# Patient Record
Sex: FEMALE | Race: OTHER | HISPANIC OR LATINO | URBAN - METROPOLITAN AREA
[De-identification: names, ages, dates, MRNs, and addresses within clinical notes are randomized per-mention and may not be internally consistent; named-entity substitution may affect disease eponyms.]

---

## 2021-05-17 ENCOUNTER — APPOINTMENT (RX ONLY)
Dept: URBAN - METROPOLITAN AREA CLINIC 96 | Facility: CLINIC | Age: 31
Setting detail: DERMATOLOGY
End: 2021-05-17

## 2021-05-17 DIAGNOSIS — L91.8 OTHER HYPERTROPHIC DISORDERS OF THE SKIN: ICD-10-CM

## 2021-05-17 DIAGNOSIS — L21.8 OTHER SEBORRHEIC DERMATITIS: ICD-10-CM

## 2021-05-17 DIAGNOSIS — L85.3 XEROSIS CUTIS: ICD-10-CM

## 2021-05-17 DIAGNOSIS — H01.13 ECZEMATOUS DERMATITIS OF EYELID: ICD-10-CM

## 2021-05-17 DIAGNOSIS — D18.0 HEMANGIOMA: ICD-10-CM

## 2021-05-17 PROBLEM — D23.61 OTHER BENIGN NEOPLASM OF SKIN OF RIGHT UPPER LIMB, INCLUDING SHOULDER: Status: ACTIVE | Noted: 2021-05-17

## 2021-05-17 PROBLEM — D18.01 HEMANGIOMA OF SKIN AND SUBCUTANEOUS TISSUE: Status: ACTIVE | Noted: 2021-05-17

## 2021-05-17 PROBLEM — H01.134 ECZEMATOUS DERMATITIS OF LEFT UPPER EYELID: Status: ACTIVE | Noted: 2021-05-17

## 2021-05-17 PROBLEM — D23.71 OTHER BENIGN NEOPLASM OF SKIN OF RIGHT LOWER LIMB, INCLUDING HIP: Status: ACTIVE | Noted: 2021-05-17

## 2021-05-17 PROBLEM — H01.131 ECZEMATOUS DERMATITIS OF RIGHT UPPER EYELID: Status: ACTIVE | Noted: 2021-05-17

## 2021-05-17 PROCEDURE — 99204 OFFICE O/P NEW MOD 45 MIN: CPT

## 2021-05-17 PROCEDURE — ? PRESCRIPTION

## 2021-05-17 PROCEDURE — ? COUNSELING

## 2021-05-17 PROCEDURE — ? FULL BODY SKIN EXAM - DECLINED

## 2021-05-17 PROCEDURE — ? ADDITIONAL NOTES

## 2021-05-17 RX ORDER — KETOCONAZOLE 20 MG/G
CREAM TOPICAL BID
Qty: 1 | Refills: 2 | Status: ERX | COMMUNITY
Start: 2021-05-17

## 2021-05-17 RX ORDER — HYDROCORTISONE 25 MG/G
OINTMENT TOPICAL BID
Qty: 1 | Refills: 1 | Status: ERX | COMMUNITY
Start: 2021-05-17

## 2021-05-17 RX ORDER — AMMONIUM LACTATE 12 G/100G
LOTION TOPICAL BID
Qty: 1 | Refills: 5 | Status: ERX | COMMUNITY
Start: 2021-05-17

## 2021-05-17 RX ORDER — HYDROCORTISONE 25 MG/G
CREAM TOPICAL BID
Qty: 1 | Refills: 1 | Status: ERX | COMMUNITY
Start: 2021-05-17

## 2021-05-17 RX ADMIN — KETOCONAZOLE: 20 CREAM TOPICAL at 00:00

## 2021-05-17 RX ADMIN — HYDROCORTISONE: 25 CREAM TOPICAL at 00:00

## 2021-05-17 RX ADMIN — HYDROCORTISONE: 25 OINTMENT TOPICAL at 00:00

## 2021-05-17 RX ADMIN — AMMONIUM LACTATE: 12 LOTION TOPICAL at 00:00

## 2021-05-17 ASSESSMENT — LOCATION DETAILED DESCRIPTION DERM
LOCATION DETAILED: LEFT CENTRAL POSTAURICULAR SKIN
LOCATION DETAILED: LEFT CYMBA CONCHA
LOCATION DETAILED: RIGHT INFERIOR POSTAURICULAR SKIN
LOCATION DETAILED: LEFT INFERIOR ANTERIOR NECK
LOCATION DETAILED: RIGHT POSTERIOR SHOULDER
LOCATION DETAILED: LEFT SUPERIOR MEDIAL FOREHEAD
LOCATION DETAILED: RIGHT CYMBA CONCHA
LOCATION DETAILED: LEFT ELBOW
LOCATION DETAILED: LEFT MEDIAL SUPERIOR EYELID
LOCATION DETAILED: RIGHT ELBOW
LOCATION DETAILED: RIGHT MEDIAL SUPERIOR EYELID

## 2021-05-17 ASSESSMENT — LOCATION SIMPLE DESCRIPTION DERM
LOCATION SIMPLE: SCALP
LOCATION SIMPLE: LEFT ANTERIOR NECK
LOCATION SIMPLE: RIGHT SHOULDER
LOCATION SIMPLE: RIGHT ELBOW
LOCATION SIMPLE: LEFT EAR
LOCATION SIMPLE: LEFT ELBOW
LOCATION SIMPLE: LEFT FOREHEAD
LOCATION SIMPLE: LEFT SUPERIOR EYELID
LOCATION SIMPLE: RIGHT EAR
LOCATION SIMPLE: RIGHT SUPERIOR EYELID

## 2021-05-17 ASSESSMENT — LOCATION ZONE DERM
LOCATION ZONE: NECK
LOCATION ZONE: SCALP
LOCATION ZONE: EYELID
LOCATION ZONE: ARM
LOCATION ZONE: EAR
LOCATION ZONE: FACE

## 2021-05-17 NOTE — PROCEDURE: ADDITIONAL NOTES
Detail Level: Simple
Additional Notes: Patient consent was obtained to proceed with the visit and recommended plan of care after discussion of all risks and benefits, including the risks of COVID-19 exposure.
Render Risk Assessment In Note?: no
Additional Notes: Pt quoted $200 for cosmetic removal of up to 17 skin tags.

## 2021-05-17 NOTE — HPI: SKIN LESION
What Type Of Note Output Would You Prefer (Optional)?: Standard Output
Has Your Skin Lesion Been Treated?: not been treated
Is This A New Presentation, Or A Follow-Up?: Skin Lesions
Which Family Member (Optional)?: Aunt